# Patient Record
Sex: MALE | Race: WHITE | NOT HISPANIC OR LATINO | Employment: OTHER | RURAL
[De-identification: names, ages, dates, MRNs, and addresses within clinical notes are randomized per-mention and may not be internally consistent; named-entity substitution may affect disease eponyms.]

---

## 2020-08-26 ENCOUNTER — HISTORICAL (OUTPATIENT)
Dept: ADMINISTRATIVE | Facility: HOSPITAL | Age: 70
End: 2020-08-26

## 2020-08-27 LAB — SARS-COV+SARS-COV-2 AG RESP QL IA.RAPID: NEGATIVE

## 2021-06-25 DIAGNOSIS — E29.1 TESTICULAR HYPOFUNCTION: Primary | ICD-10-CM

## 2021-07-07 ENCOUNTER — PROCEDURE VISIT (OUTPATIENT)
Dept: UROLOGY | Facility: CLINIC | Age: 71
End: 2021-07-07
Payer: MEDICARE

## 2021-07-07 VITALS
SYSTOLIC BLOOD PRESSURE: 125 MMHG | HEART RATE: 70 BPM | BODY MASS INDEX: 28.28 KG/M2 | WEIGHT: 202 LBS | DIASTOLIC BLOOD PRESSURE: 82 MMHG | HEIGHT: 71 IN

## 2021-07-07 DIAGNOSIS — E29.1 TESTICULAR HYPOFUNCTION: Primary | ICD-10-CM

## 2021-07-07 DIAGNOSIS — R53.83 FATIGUE, UNSPECIFIED TYPE: ICD-10-CM

## 2021-07-07 PROCEDURE — 11980 IMPLANT HORMONE PELLET(S): CPT | Mod: PBBFAC | Performed by: UROLOGY

## 2021-07-07 PROCEDURE — 11980 PR IMPLANT,HORMONE,SUBCUTANEOUS: ICD-10-PCS | Mod: S$PBB,,, | Performed by: UROLOGY

## 2021-07-07 PROCEDURE — S0189 TESTOSTERONE PELLET 75 MG: HCPCS | Mod: PBBFAC | Performed by: UROLOGY

## 2021-07-07 PROCEDURE — 11980 IMPLANT HORMONE PELLET(S): CPT | Mod: S$PBB,,, | Performed by: UROLOGY

## 2021-07-07 RX ORDER — AA/PROT/LYSINE/METHIO/VIT C/B6 50-12.5 MG
10 TABLET ORAL DAILY
COMMUNITY

## 2021-07-07 RX ORDER — CETIRIZINE HYDROCHLORIDE 10 MG/1
10 TABLET ORAL DAILY
COMMUNITY

## 2021-07-07 RX ORDER — LANSOPRAZOLE 30 MG/1
30 CAPSULE, DELAYED RELEASE ORAL 2 TIMES DAILY
COMMUNITY
Start: 2021-04-22

## 2021-07-07 RX ORDER — HYDROCODONE BITARTRATE AND ACETAMINOPHEN 7.5; 325 MG/1; MG/1
1 TABLET ORAL
COMMUNITY
End: 2023-03-16

## 2021-07-07 RX ORDER — PERPHENAZINE/AMITRIPTYLINE HCL 4 MG-25 MG
TABLET ORAL DAILY
COMMUNITY

## 2021-07-07 RX ORDER — CLONAZEPAM 2 MG/1
1 TABLET ORAL 2 TIMES DAILY
COMMUNITY
Start: 2021-06-08

## 2021-07-07 RX ORDER — TAMSULOSIN HYDROCHLORIDE 0.4 MG/1
CAPSULE ORAL
COMMUNITY
Start: 2021-06-29 | End: 2022-03-11 | Stop reason: SDUPTHER

## 2021-07-07 RX ORDER — TRAMADOL HYDROCHLORIDE 50 MG/1
25 TABLET ORAL NIGHTLY PRN
COMMUNITY
Start: 2021-06-02

## 2021-07-07 RX ORDER — CEPHRADINE 500 MG
5000 CAPSULE ORAL DAILY
COMMUNITY

## 2021-07-07 RX ORDER — HYDROCHLOROTHIAZIDE 12.5 MG/1
12.5 TABLET ORAL DAILY
COMMUNITY
Start: 2021-05-10

## 2021-07-07 RX ORDER — LISINOPRIL 20 MG/1
20 TABLET ORAL 2 TIMES DAILY
COMMUNITY
Start: 2021-05-31

## 2021-07-07 RX ORDER — MULTIVITAMIN
1 TABLET ORAL DAILY
COMMUNITY

## 2021-07-07 RX ORDER — ASPIRIN 81 MG/1
81 TABLET ORAL DAILY
COMMUNITY

## 2021-07-07 RX ORDER — DORZOLAMIDE HYDROCHLORIDE AND TIMOLOL MALEATE 20; 5 MG/ML; MG/ML
SOLUTION/ DROPS OPHTHALMIC
COMMUNITY
Start: 2021-06-28

## 2021-07-07 RX ADMIN — TESTOSTERONE 900 MG: 75 PELLET SUBCUTANEOUS at 05:07

## 2021-07-09 PROBLEM — R53.83 FATIGUE: Status: ACTIVE | Noted: 2021-07-09

## 2021-07-09 PROBLEM — E29.1 TESTICULAR HYPOFUNCTION: Status: ACTIVE | Noted: 2021-07-09

## 2021-11-08 ENCOUNTER — PROCEDURE VISIT (OUTPATIENT)
Dept: UROLOGY | Facility: CLINIC | Age: 71
End: 2021-11-08
Payer: MEDICARE

## 2021-11-08 VITALS
HEIGHT: 71 IN | HEART RATE: 78 BPM | DIASTOLIC BLOOD PRESSURE: 81 MMHG | SYSTOLIC BLOOD PRESSURE: 115 MMHG | BODY MASS INDEX: 27.44 KG/M2 | WEIGHT: 196 LBS

## 2021-11-08 DIAGNOSIS — Z12.5 SCREENING PSA (PROSTATE SPECIFIC ANTIGEN): ICD-10-CM

## 2021-11-08 DIAGNOSIS — E29.1 TESTICULAR HYPOFUNCTION: Primary | ICD-10-CM

## 2021-11-08 DIAGNOSIS — R53.83 OTHER FATIGUE: ICD-10-CM

## 2021-11-08 PROCEDURE — 11980 IMPLANT HORMONE PELLET(S): CPT | Mod: PBBFAC | Performed by: UROLOGY

## 2021-11-08 PROCEDURE — 11980 IMPLANT HORMONE PELLET(S): CPT | Mod: S$PBB,,, | Performed by: UROLOGY

## 2021-11-08 PROCEDURE — S0189 TESTOSTERONE PELLET 75 MG: HCPCS | Mod: PBBFAC | Performed by: UROLOGY

## 2021-11-08 PROCEDURE — 11980 PR IMPLANT,HORMONE,SUBCUTANEOUS: ICD-10-PCS | Mod: S$PBB,,, | Performed by: UROLOGY

## 2021-11-08 RX ORDER — ACETAMINOPHEN 500 MG
5000 TABLET ORAL DAILY
COMMUNITY

## 2021-11-08 RX ORDER — LIDOCAINE HYDROCHLORIDE AND EPINEPHRINE 10; 10 MG/ML; UG/ML
15 INJECTION, SOLUTION INFILTRATION; PERINEURAL
Status: COMPLETED | OUTPATIENT
Start: 2021-11-08 | End: 2021-11-08

## 2021-11-08 RX ADMIN — TESTOSTERONE 900 MG: 75 PELLET SUBCUTANEOUS at 05:11

## 2021-11-08 RX ADMIN — LIDOCAINE HYDROCHLORIDE,EPINEPHRINE BITARTRATE 15 ML: 10; .01 INJECTION, SOLUTION INFILTRATION; PERINEURAL at 05:11

## 2022-01-25 PROCEDURE — 88305 TISSUE EXAM BY PATHOLOGIST: CPT | Mod: 26,,, | Performed by: PATHOLOGY

## 2022-01-25 PROCEDURE — 88305 TISSUE EXAM BY PATHOLOGIST: CPT | Mod: SUR

## 2022-01-25 PROCEDURE — 88305 PATHOLOGY, DERMATOLOGY: ICD-10-PCS | Mod: 26,,, | Performed by: PATHOLOGY

## 2022-01-26 ENCOUNTER — LAB REQUISITION (OUTPATIENT)
Dept: LAB | Facility: HOSPITAL | Age: 72
End: 2022-01-26
Payer: MEDICARE

## 2022-01-26 DIAGNOSIS — D49.2 NEOPLASM OF UNSPECIFIED BEHAVIOR OF BONE, SOFT TISSUE, AND SKIN: ICD-10-CM

## 2022-01-27 LAB
ESTROGEN SERPL-MCNC: NORMAL PG/ML
LAB AP GROSS DESCRIPTION: NORMAL
LAB AP LABORATORY NOTES: NORMAL
LAB AP SPEC A DDX: NORMAL
LAB AP SPEC A MORPHOLOGY: NORMAL
LAB AP SPEC A PROCEDURE: NORMAL
T3RU NFR SERPL: NORMAL %

## 2022-03-09 ENCOUNTER — PROCEDURE VISIT (OUTPATIENT)
Dept: UROLOGY | Facility: CLINIC | Age: 72
End: 2022-03-09
Payer: MEDICARE

## 2022-03-09 VITALS
SYSTOLIC BLOOD PRESSURE: 127 MMHG | WEIGHT: 201 LBS | HEIGHT: 71 IN | DIASTOLIC BLOOD PRESSURE: 89 MMHG | BODY MASS INDEX: 28.14 KG/M2 | HEART RATE: 76 BPM

## 2022-03-09 DIAGNOSIS — E29.1 TESTICULAR HYPOFUNCTION: Primary | ICD-10-CM

## 2022-03-09 DIAGNOSIS — R53.83 FATIGUE, UNSPECIFIED TYPE: ICD-10-CM

## 2022-03-09 DIAGNOSIS — R97.20 ELEVATED PSA: ICD-10-CM

## 2022-03-09 PROCEDURE — 11980 IMPLANT HORMONE PELLET(S): CPT | Mod: S$PBB,,, | Performed by: UROLOGY

## 2022-03-09 PROCEDURE — S0189 TESTOSTERONE PELLET 75 MG: HCPCS | Mod: PBBFAC | Performed by: UROLOGY

## 2022-03-09 PROCEDURE — 11980 PR IMPLANT,HORMONE,SUBCUTANEOUS: ICD-10-PCS | Mod: S$PBB,,, | Performed by: UROLOGY

## 2022-03-09 PROCEDURE — 11980 IMPLANT HORMONE PELLET(S): CPT | Mod: PBBFAC | Performed by: UROLOGY

## 2022-03-09 RX ORDER — KETOCONAZOLE 20 MG/G
CREAM TOPICAL
COMMUNITY
Start: 2022-01-26

## 2022-03-09 RX ORDER — OMEGA-3/DHA/EPA/FISH OIL 35-113.5MG
1 TABLET,CHEWABLE ORAL DAILY
COMMUNITY

## 2022-03-09 RX ORDER — HYDROCORTISONE 25 MG/ML
LOTION TOPICAL
COMMUNITY
Start: 2022-01-25

## 2022-03-09 RX ORDER — LIDOCAINE HYDROCHLORIDE AND EPINEPHRINE 10; 10 MG/ML; UG/ML
30 INJECTION, SOLUTION INFILTRATION; PERINEURAL
Status: COMPLETED | OUTPATIENT
Start: 2022-03-09 | End: 2022-03-09

## 2022-03-09 RX ADMIN — LIDOCAINE HYDROCHLORIDE,EPINEPHRINE BITARTRATE 30 ML: 10; .01 INJECTION, SOLUTION INFILTRATION; PERINEURAL at 04:03

## 2022-03-09 RX ADMIN — TESTOSTERONE 900 MG: 75 PELLET SUBCUTANEOUS at 04:03

## 2022-03-09 NOTE — PROCEDURES
Procedures        Mr. Montez returned today for Testopel insertion for the first time in 4-1/2 months. He thinks he may be trying to go a little too long between his Testopel insertions. The testosterone done at Dr. Bazzi's office last week was still 348 so he is still in range he should be, but he wants to have his insertions a little closer together as patient's fatigue is increased. Also under more stress. Sister  of a Covid problems since we last saw him. Voiding okay. Ready for another Testopel insertion. He had trouble with his hip for a while after the last insertion so we may want to move to the abdominal wall for his Testopel.  [2020]     Mr. Montez is in for Testopel insertion. Lab looks satisfactory. He had Covid on  but had treatment with monoclonal antibody and did very well with the disease. He had fatigue but other than that has done fine. He also has had some increase in bladder outlet obstructive symptoms with weak stream and intermittency. He had his PSA and it is perfectly normal at 2.59. Lab studies look good; he is pleased with the improvement in his lab results. Serum testosterone a couple weeks ago was 538. Ready for next Testopel insertion. He did not like receiving it in the abdominal wall and wants to go back to putting the Testopel in his hips.  [2021]  ---------------------------------------------------------------------------------------------------------------------------------------------------------------------------------------------------  The above notes are from the old electronic system.  Patient here for testoterone pellet insertion.  He did not have any lab data drawn prior to this testosterone pellet insertion as he forgot.  We will skip lab until next time. [,  2021]  ---------------------------------------------------------------------------------------------------------------------------------------------------------------------------------------   Patient has had no new health problems.  He did have his lab data drawn.  PSA is slightly elevated at 4.27  Serum testosterone 587 on March 2, 2022          The patient was placed in the  left lateral jackknife position and the left hip prepared for injection of Testopel in the clinic operating room.  The left sacroiliac joint and the iliac crest were identified. The midline of the imaginary line between these 2 points was identified. A heidi was made 3 cm below this point and a V-shaped marking was made or previous scar was present 2cm inferior to this point. The area was prepped with Betadine and surgical drape applied to the area. This V-shaped area that was outlined was anesthetized with  approximately 25 mL of local anesthetic made by mixing 30 mL of 2% lidocaine and 10 mL of 8.4% sodium bicarbonate solution.  A 8 mm transverse curvilinear incision was made at the apex of the V. A spinal needle was used to anesthetize  along the expected tract of the injected pellets The Testopel stylette with trocar was placed through the stab wound and moved parallel to the skin about 1 cm underneath the skin  in the subcutaneous tissue along the more posterior side of the V. There did seem to be a lot of scar tissue present.  The stylette was removed and 6 pellets of Testopel placed in the trocar. The blunt stylette was then passed down to touch the pellets. This was advanced forward as the trocar was removed leaving the 6 pellets along the posterior side of the V. The procedure was repeated along the more anterior side of the V, again leaving 6 pellets in  for a total of 12. The incision was closed with 3 sutures of 3-0 chromic catgut. Pressure was applied to the wound for 5 minutes. Dressing applied. The patient tolerated the  procedure well and left for a regular exam room in satisfactory condition. [March 9, 2022]

## 2022-03-11 RX ORDER — TAMSULOSIN HYDROCHLORIDE 0.4 MG/1
CAPSULE ORAL
Qty: 90 CAPSULE | Refills: 3 | Status: SHIPPED | OUTPATIENT
Start: 2022-03-11 | End: 2023-03-13

## 2022-03-12 NOTE — PATIENT INSTRUCTIONS
Testosterone pellet insertion as described.  Tolerated well.  Next Testopel insertion on Thursday July 7th at 4:00 p.m.  He will get his lab in [place] again.  Probably needs to get another PSA at that time as it is slightly elevated.

## 2022-07-07 ENCOUNTER — PROCEDURE VISIT (OUTPATIENT)
Dept: UROLOGY | Facility: CLINIC | Age: 72
End: 2022-07-07
Payer: MEDICARE

## 2022-07-07 VITALS
BODY MASS INDEX: 27.44 KG/M2 | HEART RATE: 68 BPM | SYSTOLIC BLOOD PRESSURE: 107 MMHG | HEIGHT: 71 IN | WEIGHT: 196 LBS | DIASTOLIC BLOOD PRESSURE: 76 MMHG

## 2022-07-07 DIAGNOSIS — E29.1 TESTICULAR HYPOFUNCTION: Primary | ICD-10-CM

## 2022-07-07 DIAGNOSIS — R97.20 ELEVATED PSA: ICD-10-CM

## 2022-07-07 DIAGNOSIS — R53.83 FATIGUE, UNSPECIFIED TYPE: ICD-10-CM

## 2022-07-07 PROCEDURE — 11980 PR IMPLANT,HORMONE,SUBCUTANEOUS: ICD-10-PCS | Mod: S$PBB,,, | Performed by: UROLOGY

## 2022-07-07 PROCEDURE — 11980 IMPLANT HORMONE PELLET(S): CPT | Mod: PBBFAC | Performed by: UROLOGY

## 2022-07-07 PROCEDURE — 11980 IMPLANT HORMONE PELLET(S): CPT | Mod: S$PBB,,, | Performed by: UROLOGY

## 2022-07-07 PROCEDURE — S0189 TESTOSTERONE PELLET 75 MG: HCPCS | Mod: PBBFAC | Performed by: UROLOGY

## 2022-07-07 RX ORDER — CHOLECALCIFEROL (VITAMIN D3) 25 MCG
1 TABLET,CHEWABLE ORAL NIGHTLY
COMMUNITY

## 2022-07-07 RX ORDER — SILDENAFIL 100 MG/1
TABLET, FILM COATED ORAL
COMMUNITY
Start: 2022-05-25 | End: 2023-07-19 | Stop reason: SDUPTHER

## 2022-07-07 RX ADMIN — TESTOSTERONE 900 MG: 75 PELLET SUBCUTANEOUS at 07:07

## 2022-07-08 NOTE — PROCEDURES
Procedures        Testopel insertion     The patient was placed in the right  lateral jackknife position and the right hip prepared for injection of Testopel in the clinic operating room.  The right sacroiliac joint and the iliac crest were identified. The midline of the imaginary line between these 2 points was identified. A heidi was made 3 cm below this point and a V-shaped marking was made or previous scar was present 2cm inferior to this point. The area was prepped with Betadine and surgical drape applied to the area. This V-shaped area that was outlined was anesthetized with  approximately 17 mL of local anesthetic made by mixing 15 mL of 2% lidocaine and 5 mL of 8.4% sodium bicarbonate solution.  A 8 mm transverse curvilinear incision was made at the apex of the V. A spinal needle was used to anesthetize  along the expected tract of the injected pellets The Testopel stylette with trocar was placed through the stab wound and moved parallel to the skin about 1 cm underneath the skin  in the subcutaneous tissue along the more posterior side of the V. There did seem to be a lot of scar tissue present.  The stylette was removed and 6 pellets of Testopel placed in the trocar. The blunt stylette was then passed down to touch the pellets. This was advanced forward as the trocar was removed leaving the 6 pellets along the posterior side of the V. The procedure was repeated along the more anterior side of the V, again leaving 6 pellets in  for a total of 12 [900 mg]. The incision was closed with 3 sutures of 3-0 chromic catgut. Pressure was applied to the wound for 5 minutes. Dressing applied. The patient tolerated the procedure well and left for a regular exam room in satisfactory  condition.    ----------------------------------------------------------------------------------------------------------------------------------------------------------------------------------------------------------------------------------------------------------------------------------------------------------------  Mr. Montez  returned today for his testoterone pellet insertion.  He has had a good 4 months.  Laboratory data includes satisfactory testosterone of 379.  Hemoglobin/hematocrit was 12.6 g/45.8%  Basic metabolic profile satisfactory.  BUN elevated at 28 but creatinine normal at 1.2 .  No new health issues.

## 2022-07-08 NOTE — PATIENT INSTRUCTIONS
Testosterone pellet insertion as described.  Tolerated well.  Return for next Testopel insertion on Wednesday November 16th at 3:00 p.m..  No PSA done with current lab.  He will get his lab work prior to next visit with testosterone, H&H, and needs repeat PSA because last PSA was elevated.

## 2022-11-16 ENCOUNTER — PROCEDURE VISIT (OUTPATIENT)
Dept: UROLOGY | Facility: CLINIC | Age: 72
End: 2022-11-16
Payer: MEDICARE

## 2022-11-16 VITALS
HEIGHT: 71 IN | WEIGHT: 200 LBS | DIASTOLIC BLOOD PRESSURE: 60 MMHG | BODY MASS INDEX: 28 KG/M2 | SYSTOLIC BLOOD PRESSURE: 130 MMHG | HEART RATE: 87 BPM

## 2022-11-16 DIAGNOSIS — R53.83 FATIGUE, UNSPECIFIED TYPE: ICD-10-CM

## 2022-11-16 DIAGNOSIS — R97.20 ELEVATED PSA: ICD-10-CM

## 2022-11-16 DIAGNOSIS — E29.1 TESTICULAR HYPOFUNCTION: Primary | ICD-10-CM

## 2022-11-16 PROCEDURE — 11980 IMPLANT HORMONE PELLET(S): CPT | Mod: S$PBB,,, | Performed by: UROLOGY

## 2022-11-16 PROCEDURE — 11980 IMPLANT HORMONE PELLET(S): CPT | Mod: PBBFAC | Performed by: UROLOGY

## 2022-11-16 PROCEDURE — 11980 PR IMPLANT,HORMONE,SUBCUTANEOUS: ICD-10-PCS | Mod: S$PBB,,, | Performed by: UROLOGY

## 2022-11-16 PROCEDURE — S0189 TESTOSTERONE PELLET 75 MG: HCPCS | Mod: PBBFAC | Performed by: UROLOGY

## 2022-11-16 RX ORDER — LIDOCAINE HYDROCHLORIDE AND EPINEPHRINE 10; 10 MG/ML; UG/ML
15 INJECTION, SOLUTION INFILTRATION; PERINEURAL
Status: COMPLETED | OUTPATIENT
Start: 2022-11-16 | End: 2022-11-16

## 2022-11-16 RX ADMIN — TESTOSTERONE 900 MG: 75 PELLET SUBCUTANEOUS at 05:11

## 2022-11-16 RX ADMIN — LIDOCAINE HYDROCHLORIDE,EPINEPHRINE BITARTRATE 15 ML: 10; .01 INJECTION, SOLUTION INFILTRATION; PERINEURAL at 05:11

## 2022-11-17 NOTE — PATIENT INSTRUCTIONS
Testopel insertion as described.  He needs follow-up of the PSA and may need to consider prostate biopsies if still elevated.  Patient will have next testosterone pellet insertion Thursday March 16, 2023 at 3:30 p.m..  He will get his lab done at Dr. Bazzi's office ahead of time.  He needs to get a total testosterone, along with PSA, and H&H.  Follow-up blood pressure 130/60

## 2022-11-17 NOTE — PROCEDURES
Procedures    Patient in for Testopel.  Doing okay clinically.  Also has had elevated PSA on last 2 checks.  The patient had a PSA of 6.38 on November 9.  A total testosterone was not done on the lab he had done that day.  Hemoglobin/hematocrit was 14.2 g/48.8%  Blood pressure has been running low lately and he may need to reduce his blood pressure medication as it sounds like he is possibly having some postural hypotension at times.  Initial blood pressure here today was 93/60 with follow-up 130/60       The patient was placed in the  left lateral jackknife position and the left hip prepared for injection of Testopel in the clinic operating room.  The left sacroiliac joint and the iliac crest were identified. The midline of the imaginary line between these 2 points was identified. A heidi was made 3 cm below this point and a V-shaped marking was made or previous scar was present 2cm inferior to this point. The area was prepped with Betadine and surgical drape applied to the area. This V-shaped area that was outlined was anesthetized with  approximately 15 mL of local anesthetic made by mixing 15 mL of 2% lidocaine and 5 mL of 8.4% sodium bicarbonate solution.  A 8 mm transverse curvilinear incision was made at the apex of the V. A spinal needle was used to anesthetize  along the expected tract of the injected pellets The Testopel stylette with trocar was placed through the stab wound and moved parallel to the skin about 1 cm underneath the skin  in the subcutaneous tissue along the more posterior side of the V. There did seem to be a lot of scar tissue present.  The stylette was removed and 6 pellets of Testopel placed in the trocar. The blunt stylette was then passed down to touch the pellets. This was advanced forward as the trocar was removed leaving the 6 pellets along the posterior side of the V. The procedure was repeated along the more anterior side of the V, again leaving 6 pellets in  for a total of 12. The  incision was closed with 3 sutures of 3-0 chromic catgut. Pressure was applied to the wound for 5 minutes. Dressing applied. The patient tolerated the procedure well and left for a regular exam room in satisfactory condition. [November 16, 2022]

## 2023-03-16 ENCOUNTER — PROCEDURE VISIT (OUTPATIENT)
Dept: UROLOGY | Facility: CLINIC | Age: 73
End: 2023-03-16
Payer: MEDICARE

## 2023-03-16 VITALS
BODY MASS INDEX: 28.56 KG/M2 | WEIGHT: 204 LBS | DIASTOLIC BLOOD PRESSURE: 72 MMHG | HEART RATE: 86 BPM | SYSTOLIC BLOOD PRESSURE: 98 MMHG | HEIGHT: 71 IN

## 2023-03-16 DIAGNOSIS — N32.0 BLADDER NECK OBSTRUCTION: ICD-10-CM

## 2023-03-16 DIAGNOSIS — N23 URETERAL COLIC: ICD-10-CM

## 2023-03-16 DIAGNOSIS — N20.0 NEPHROLITHIASIS: ICD-10-CM

## 2023-03-16 DIAGNOSIS — N13.8 BENIGN PROSTATIC HYPERPLASIA WITH URINARY OBSTRUCTION: ICD-10-CM

## 2023-03-16 DIAGNOSIS — R53.83 FATIGUE, UNSPECIFIED TYPE: ICD-10-CM

## 2023-03-16 DIAGNOSIS — Z87.898 HISTORY OF ELEVATED PSA: ICD-10-CM

## 2023-03-16 DIAGNOSIS — N40.1 BENIGN PROSTATIC HYPERPLASIA WITH URINARY OBSTRUCTION: ICD-10-CM

## 2023-03-16 DIAGNOSIS — E29.1 TESTICULAR HYPOFUNCTION: Primary | ICD-10-CM

## 2023-03-16 PROCEDURE — 11980 PR IMPLANT,HORMONE,SUBCUTANEOUS: ICD-10-PCS | Mod: S$PBB,,, | Performed by: UROLOGY

## 2023-03-16 PROCEDURE — 11980 IMPLANT HORMONE PELLET(S): CPT | Mod: PBBFAC | Performed by: UROLOGY

## 2023-03-16 PROCEDURE — 11980 IMPLANT HORMONE PELLET(S): CPT | Mod: S$PBB,,, | Performed by: UROLOGY

## 2023-03-16 PROCEDURE — S0189 TESTOSTERONE PELLET 75 MG: HCPCS | Mod: PBBFAC | Performed by: UROLOGY

## 2023-03-16 RX ORDER — LIDOCAINE HYDROCHLORIDE AND EPINEPHRINE 10; 10 MG/ML; UG/ML
15 INJECTION, SOLUTION INFILTRATION; PERINEURAL
Status: COMPLETED | OUTPATIENT
Start: 2023-03-16 | End: 2023-03-16

## 2023-03-16 RX ORDER — HYDROMORPHONE HYDROCHLORIDE 2 MG/1
2 TABLET ORAL EVERY 4 HOURS PRN
Qty: 16 TABLET | Refills: 0 | Status: SHIPPED | OUTPATIENT
Start: 2023-03-16

## 2023-03-16 RX ORDER — GUAIFENESIN/PHENYLPROPANOLAMIN
450 EXPECTORANT ORAL DAILY
COMMUNITY

## 2023-03-16 RX ADMIN — TESTOSTERONE 900 MG: 75 PELLET SUBCUTANEOUS at 05:03

## 2023-03-16 RX ADMIN — LIDOCAINE HYDROCHLORIDE,EPINEPHRINE BITARTRATE 15 ML: 10; .01 INJECTION, SOLUTION INFILTRATION; PERINEURAL at 05:03

## 2023-03-17 NOTE — PATIENT INSTRUCTIONS
Testosterone pellet insertion as described.  Reviewed CT scan with patient.  He does have several remaining small stones in his upper tracts.   done.  Patient given prescription for Dilaudid to use when he has additional acute pain.  He already has Phenergan at home.  Return for next Testopel at 1:30 p.m. on Wednesday July 19.

## 2023-03-17 NOTE — PROCEDURES
Procedures    Testopel insertion     The patient was placed in the right  lateral jackknife position and the right hip prepared for injection of Testopel in the clinic operating room.  The right sacroiliac joint and the iliac crest were identified. The midline of the imaginary line between these 2 points was identified. A heidi was made 3 cm below this point and a V-shaped marking was made or previous scar was present 2cm inferior to this point. The area was prepped with Betadine and surgical drape applied to the area. This V-shaped area that was outlined was anesthetized with  approximately 15 mL of local anesthetic made by mixing 15 mL of 2% lidocaine and 5 mL of 8.4% sodium bicarbonate solution.  A 8 mm transverse curvilinear incision was made at the apex of the V. A spinal needle was used to anesthetize  along the expected tract of the injected pellets The Testopel stylette with trocar was placed through the stab wound and moved parallel to the skin about 1 cm underneath the skin  in the subcutaneous tissue along the more posterior side of the V. There did seem to be a lot of scar tissue present.  The stylette was removed and 6 pellets of Testopel placed in the trocar. The blunt stylette was then passed down to touch the pellets. This was advanced forward as the trocar was removed leaving the 6 pellets along the posterior side of the V. The procedure was repeated along the more anterior side of the V, again leaving 6 pellets in  for a total of 12 [900 mg]. The incision was closed with 4 sutures of 3-0 chromic catgut. Pressure was applied to the wound for 5 minutes. Dressing applied. The patient tolerated the procedure well and left for a regular exam room in satisfactory condition.          Mr. Montez  returned today for his testoterone pellet insertion.  He has had a good 4 months in general except he did have a stone that passed in January in another 1 the other day.  He brought a copy of his CT scans on disc  for us to review.  Reviewed with patient and he has several additional stones in his upper tracts.  He is past the 2 stones that had given him trouble.  Laboratory data includes satisfactory testosterone of 453.  Hemoglobin/hematocrit was 13.4 g/46.2%  PSA down slightly to 3.710  No new health issues.

## 2023-07-19 ENCOUNTER — PROCEDURE VISIT (OUTPATIENT)
Dept: UROLOGY | Facility: CLINIC | Age: 73
End: 2023-07-19
Payer: MEDICARE

## 2023-07-19 VITALS
HEART RATE: 78 BPM | HEIGHT: 71 IN | DIASTOLIC BLOOD PRESSURE: 93 MMHG | SYSTOLIC BLOOD PRESSURE: 140 MMHG | BODY MASS INDEX: 28 KG/M2 | WEIGHT: 200 LBS

## 2023-07-19 DIAGNOSIS — N13.8 BENIGN PROSTATIC HYPERPLASIA WITH URINARY OBSTRUCTION: ICD-10-CM

## 2023-07-19 DIAGNOSIS — R97.20 ELEVATED PSA: ICD-10-CM

## 2023-07-19 DIAGNOSIS — N40.1 BENIGN PROSTATIC HYPERPLASIA WITH URINARY OBSTRUCTION: ICD-10-CM

## 2023-07-19 DIAGNOSIS — N52.9 ERECTILE DYSFUNCTION, UNSPECIFIED ERECTILE DYSFUNCTION TYPE: ICD-10-CM

## 2023-07-19 DIAGNOSIS — E29.1 TESTICULAR HYPOFUNCTION: Primary | ICD-10-CM

## 2023-07-19 DIAGNOSIS — N32.0 BLADDER NECK OBSTRUCTION: ICD-10-CM

## 2023-07-19 DIAGNOSIS — R53.83 FATIGUE, UNSPECIFIED TYPE: ICD-10-CM

## 2023-07-19 PROCEDURE — 11980 PR IMPLANT,HORMONE,SUBCUTANEOUS: ICD-10-PCS | Mod: S$PBB,,, | Performed by: UROLOGY

## 2023-07-19 PROCEDURE — S0189 TESTOSTERONE PELLET 75 MG: HCPCS | Mod: PBBFAC | Performed by: UROLOGY

## 2023-07-19 PROCEDURE — 11980 IMPLANT HORMONE PELLET(S): CPT | Mod: PBBFAC | Performed by: UROLOGY

## 2023-07-19 PROCEDURE — 11980 IMPLANT HORMONE PELLET(S): CPT | Mod: S$PBB,,, | Performed by: UROLOGY

## 2023-07-19 RX ORDER — LIDOCAINE HCL/EPINEPHRINE/PF 2%-1:200K
15 VIAL (ML) INJECTION
Status: COMPLETED | OUTPATIENT
Start: 2023-07-19 | End: 2023-07-19

## 2023-07-19 RX ORDER — SILDENAFIL 100 MG/1
100 TABLET, FILM COATED ORAL DAILY PRN
Qty: 30 TABLET | Refills: 11 | Status: SHIPPED | OUTPATIENT
Start: 2023-07-19

## 2023-07-19 RX ORDER — LIDOCAINE HYDROCHLORIDE AND EPINEPHRINE 20; 10 MG/ML; UG/ML
15 INJECTION, SOLUTION INFILTRATION; PERINEURAL
Status: DISCONTINUED | OUTPATIENT
Start: 2023-07-19 | End: 2023-07-19

## 2023-07-19 RX ADMIN — TESTOSTERONE 900 MG: 75 PELLET SUBCUTANEOUS at 03:07

## 2023-07-19 RX ADMIN — Medication 15 ML: at 03:07

## 2023-07-19 NOTE — PROCEDURES
Procedures    Patient doing well without any new problems.  Here for Testopel with last testosterone pellet insertion 4 months ago.  Serum testosterone recently 471. Desires renewal of his sildenafil.     The patient was placed in the  left lateral jackknife position and the left hip prepared for injection of Testopel in the clinic operating room.  The left sacroiliac joint and the iliac crest were identified. The midline of the imaginary line between these 2 points was identified. A heidi was made 3 cm below this point and a V-shaped marking was made or previous scar was present 2cm inferior to this point. The area was prepped with Betadine and surgical drape applied to the area. This V-shaped area that was outlined was anesthetized with  approximately 16 mL of local anesthetic made by mixing 15 mL of 2% lidocaine and 5 mL of 8.4% sodium bicarbonate solution.  A 8 mm transverse curvilinear incision was made at the apex of the V. A spinal needle was used to anesthetize  along the expected tract of the injected pellets The Testopel stylette with trocar was placed through the stab wound and moved parallel to the skin about 1 cm underneath the skin  in the subcutaneous tissue along the more posterior side of the V. There did seem to be a lot of scar tissue present.  The stylette was removed and 6 pellets of Testopel placed in the trocar. The blunt stylette was then passed down to touch the pellets. This was advanced forward as the trocar was removed leaving the 6 pellets along the posterior side of the V. The procedure was repeated along the more anterior side of the V, again leaving 6 pellets in  for a total of 12. The incision was closed with 4 sutures of 3-0 chromic catgut. Pressure was applied to the wound for 5 minutes. Dressing applied. The patient tolerated the procedure well and left for a regular exam room in satisfactory condition. [July 19, 2023

## 2023-07-19 NOTE — PATIENT INSTRUCTIONS
Testosterone pellet insertion as described.  Lab satisfactory.  Sildenafil renewed.  Return for next Testopel on Monday November 27 at 2:30 p.m.  He will get lab studies at Dr. Bazzi's including testosterone, H&H, and will get another PSA for elevation at that time.  Renal function is satisfactory.  Call for problems.

## 2023-11-15 PROCEDURE — 88342 IMHCHEM/IMCYTCHM 1ST ANTB: CPT | Mod: 26,,, | Performed by: PATHOLOGY

## 2023-11-15 PROCEDURE — 88305 TISSUE EXAM BY PATHOLOGIST: CPT | Mod: TC,SUR

## 2023-11-15 PROCEDURE — 88305 PATHOLOGY, DERMATOLOGY: ICD-10-PCS | Mod: 26,,, | Performed by: PATHOLOGY

## 2023-11-15 PROCEDURE — 88342 PATHOLOGY, DERMATOLOGY: ICD-10-PCS | Mod: 26,,, | Performed by: PATHOLOGY

## 2023-11-15 PROCEDURE — 88305 TISSUE EXAM BY PATHOLOGIST: CPT | Mod: 26,,, | Performed by: PATHOLOGY

## 2023-11-16 ENCOUNTER — LAB REQUISITION (OUTPATIENT)
Dept: LAB | Facility: HOSPITAL | Age: 73
End: 2023-11-16
Payer: MEDICARE

## 2023-11-16 DIAGNOSIS — D49.2 NEOPLASM OF UNSPECIFIED BEHAVIOR OF BONE, SOFT TISSUE, AND SKIN: ICD-10-CM

## 2023-11-20 LAB
DHEA SERPL-MCNC: NORMAL
ESTROGEN SERPL-MCNC: NORMAL PG/ML
INSULIN SERPL-ACNC: NORMAL U[IU]/ML
LAB AP GROSS DESCRIPTION: NORMAL
LAB AP LABORATORY NOTES: NORMAL
LAB AP SPEC A DDX: NORMAL
LAB AP SPEC A MORPHOLOGY: NORMAL
LAB AP SPEC A PROCEDURE: NORMAL
LAB AP SPEC B DDX: NORMAL
LAB AP SPEC B MORPHOLOGY: NORMAL
LAB AP SPEC B PROCEDURE: NORMAL
T3RU NFR SERPL: NORMAL %

## 2023-11-27 ENCOUNTER — PROCEDURE VISIT (OUTPATIENT)
Dept: UROLOGY | Facility: CLINIC | Age: 73
End: 2023-11-27
Payer: MEDICARE

## 2023-11-27 VITALS
DIASTOLIC BLOOD PRESSURE: 87 MMHG | WEIGHT: 198 LBS | SYSTOLIC BLOOD PRESSURE: 127 MMHG | BODY MASS INDEX: 27.72 KG/M2 | HEIGHT: 71 IN | HEART RATE: 75 BPM

## 2023-11-27 DIAGNOSIS — E29.1 TESTICULAR HYPOFUNCTION: Primary | ICD-10-CM

## 2023-11-27 DIAGNOSIS — N13.8 BENIGN PROSTATIC HYPERPLASIA WITH URINARY OBSTRUCTION: ICD-10-CM

## 2023-11-27 DIAGNOSIS — N32.0 BLADDER NECK OBSTRUCTION: ICD-10-CM

## 2023-11-27 DIAGNOSIS — R53.83 FATIGUE, UNSPECIFIED TYPE: ICD-10-CM

## 2023-11-27 DIAGNOSIS — R97.20 ELEVATED PSA: ICD-10-CM

## 2023-11-27 DIAGNOSIS — N40.1 BENIGN PROSTATIC HYPERPLASIA WITH URINARY OBSTRUCTION: ICD-10-CM

## 2023-11-27 PROCEDURE — 11980 IMPLANT HORMONE PELLET(S): CPT | Mod: S$PBB,,, | Performed by: UROLOGY

## 2023-11-27 PROCEDURE — 11980 PR IMPLANT,HORMONE,SUBCUTANEOUS: ICD-10-PCS | Mod: S$PBB,,, | Performed by: UROLOGY

## 2023-11-27 PROCEDURE — 99999PBSHW PR PBB SHADOW TECHNICAL ONLY FILED TO HB: ICD-10-PCS | Mod: PBBFAC,,,

## 2023-11-27 PROCEDURE — 11980 IMPLANT HORMONE PELLET(S): CPT | Mod: PBBFAC | Performed by: UROLOGY

## 2023-11-27 PROCEDURE — S0189 TESTOSTERONE PELLET 75 MG: HCPCS | Mod: PBBFAC | Performed by: UROLOGY

## 2023-11-27 PROCEDURE — 99999PBSHW PR PBB SHADOW TECHNICAL ONLY FILED TO HB: Mod: PBBFAC,,,

## 2023-11-27 RX ORDER — LIDOCAINE HYDROCHLORIDE AND EPINEPHRINE 10; 10 MG/ML; UG/ML
15 INJECTION, SOLUTION INFILTRATION; PERINEURAL
Status: COMPLETED | OUTPATIENT
Start: 2023-11-27 | End: 2023-11-27

## 2023-11-27 RX ADMIN — LIDOCAINE HYDROCHLORIDE,EPINEPHRINE BITARTRATE 15 ML: 10; .01 INJECTION, SOLUTION INFILTRATION; PERINEURAL at 04:11

## 2023-11-27 RX ADMIN — TESTOSTERONE 900 MG: 75 PELLET SUBCUTANEOUS at 05:11

## 2023-11-27 NOTE — PROCEDURES
Testopel insertion     The patient was placed in the right  lateral jackknife position and the right hip prepared for injection of Testopel in the clinic operating room.  The right sacroiliac joint and the iliac crest were identified. The midline of the imaginary line between these 2 points was identified. A heidi was made 3 cm below this point and a V-shaped marking was made or previous scar was present 2cm inferior to this point. The area was prepped with Betadine and surgical drape applied to the area. This V-shaped area that was outlined was anesthetized with  approximately 20 mL of local anesthetic made by mixing 15 mL of 2% lidocaine and 5 mL of 8.4% sodium bicarbonate solution.  A 8 mm transverse curvilinear incision was made at the apex of the V. A spinal needle was used to anesthetize  along the expected tract of the injected pellets The Testopel stylette with trocar was placed through the stab wound and moved parallel to the skin about 1 cm underneath the skin  in the subcutaneous tissue along the more posterior side of the V. There did seem to be a lot of scar tissue present.  The stylette was removed and 6 pellets of Testopel placed in the trocar. The blunt stylette was then passed down to touch the pellets. This was advanced forward as the trocar was removed leaving the 6 pellets along the posterior side of the V. The procedure was repeated along the more anterior side of the V, again leaving 6 pellets in  for a total of 12 [900 mg]. The incision was closed with 4 sutures of 3-0 chromic catgut. Pressure was applied to the wound for 5 minutes. Dressing applied. The patient tolerated the procedure well and left for a regular exam room in satisfactory condition.    Serum testosterone was 316 on November 21, 2023    PSA was 4.53 on November 21, 2023    Hemoglobin/hematocrit was 12.4 g/42.4% on November 21, 2023

## 2023-11-28 NOTE — PATIENT INSTRUCTIONS
12 pellets 75 mg testosterone placed as described for a total of 900 mg testosterone.  PSA is elevated but was previously higher than it is now and came back down to normal last year.  Patient tolerated procedure well.  He will return for next Testopel insertion March 27, 2024.  He will get testosterone, H&H, and will get another diagnostic PSA for PSA elevation.  He will get that done at Dr. Bazzi's office.

## 2024-01-08 ENCOUNTER — TELEPHONE (OUTPATIENT)
Dept: UROLOGY | Facility: CLINIC | Age: 74
End: 2024-01-08
Payer: MEDICARE

## 2024-03-09 DIAGNOSIS — R33.9 URINARY RETENTION: ICD-10-CM

## 2024-03-11 RX ORDER — TAMSULOSIN HYDROCHLORIDE 0.4 MG/1
0.4 CAPSULE ORAL NIGHTLY
Qty: 90 CAPSULE | Refills: 3 | Status: SHIPPED | OUTPATIENT
Start: 2024-03-11 | End: 2024-04-17 | Stop reason: SDUPTHER

## 2024-03-11 NOTE — TELEPHONE ENCOUNTER
Patients next  appointment 3/27/24 at 3:15. I spoke with patient and he wants the Tamsulosin 0.4 mg qhs sent back to [facility] in [place], [place]. Patient was in recovery room and Dr Bazzi has just completed his penile implant.

## 2024-04-17 ENCOUNTER — PROCEDURE VISIT (OUTPATIENT)
Dept: UROLOGY | Facility: CLINIC | Age: 74
End: 2024-04-17
Payer: MEDICARE

## 2024-04-17 VITALS
DIASTOLIC BLOOD PRESSURE: 72 MMHG | SYSTOLIC BLOOD PRESSURE: 96 MMHG | HEIGHT: 71 IN | HEART RATE: 98 BPM | WEIGHT: 192 LBS | BODY MASS INDEX: 26.88 KG/M2

## 2024-04-17 DIAGNOSIS — E29.1 TESTICULAR HYPOFUNCTION: Primary | ICD-10-CM

## 2024-04-17 DIAGNOSIS — R53.83 FATIGUE, UNSPECIFIED TYPE: ICD-10-CM

## 2024-04-17 DIAGNOSIS — R97.20 ELEVATED PSA: ICD-10-CM

## 2024-04-17 DIAGNOSIS — N40.1 BENIGN PROSTATIC HYPERPLASIA WITH URINARY OBSTRUCTION: ICD-10-CM

## 2024-04-17 DIAGNOSIS — N13.8 BENIGN PROSTATIC HYPERPLASIA WITH URINARY OBSTRUCTION: ICD-10-CM

## 2024-04-17 DIAGNOSIS — N32.0 BLADDER NECK OBSTRUCTION: ICD-10-CM

## 2024-04-17 DIAGNOSIS — R33.9 URINARY RETENTION: ICD-10-CM

## 2024-04-17 PROCEDURE — 99999PBSHW PR PBB SHADOW TECHNICAL ONLY FILED TO HB: Mod: PBBFAC,,,

## 2024-04-17 PROCEDURE — 11980 IMPLANT HORMONE PELLET(S): CPT | Mod: PBBFAC | Performed by: UROLOGY

## 2024-04-17 PROCEDURE — S0189 TESTOSTERONE PELLET 75 MG: HCPCS | Mod: PBBFAC | Performed by: UROLOGY

## 2024-04-17 PROCEDURE — 11980 IMPLANT HORMONE PELLET(S): CPT | Mod: S$PBB,,, | Performed by: UROLOGY

## 2024-04-17 RX ORDER — LIDOCAINE HYDROCHLORIDE AND EPINEPHRINE 20; 10 MG/ML; UG/ML
15 INJECTION, SOLUTION INFILTRATION; PERINEURAL
Status: COMPLETED | OUTPATIENT
Start: 2024-04-17 | End: 2024-04-17

## 2024-04-17 RX ORDER — TAMSULOSIN HYDROCHLORIDE 0.4 MG/1
0.4 CAPSULE ORAL 2 TIMES DAILY
Qty: 180 CAPSULE | Refills: 3 | Status: SHIPPED | OUTPATIENT
Start: 2024-04-17 | End: 2025-04-12

## 2024-04-17 RX ADMIN — LIDOCAINE HYDROCHLORIDE,EPINEPHRINE BITARTRATE 15 ML: 20; .01 INJECTION, SOLUTION INFILTRATION; PERINEURAL at 03:04

## 2024-04-17 RX ADMIN — TESTOSTERONE 900 MG: 75 PELLET SUBCUTANEOUS at 03:04

## 2024-04-17 NOTE — PROCEDURES
Testosterone pellet insertion    The patient was placed in the  left lateral jackknife position and the left hip prepared for injection of Testopel in the clinic operating room.  The left sacroiliac joint and the iliac crest were identified. The midline of the imaginary line between these 2 points was identified. A heidi was made 3 cm below this point and a V-shaped marking was made or previous scar was present 2cm inferior to this point. The area was prepped with Betadine and surgical drape applied to the area. This V-shaped area that was outlined was anesthetized with  approximately 12 mL of local anesthetic made by mixing 15 mL of 2% lidocaine and 5 mL of 8.4% sodium bicarbonate solution.  A 8 mm transverse curvilinear incision was made at the apex of the V. A spinal needle was used to anesthetize  along the expected tract of the injected pellets The Testopel stylette with trocar was placed through the stab wound and moved parallel to the skin about 1 cm underneath the skin  in the subcutaneous tissue along the more posterior side of the V. There did seem to be a lot of scar tissue present.  The stylette was removed and 6 pellets of Testopel placed in the trocar. The blunt stylette was then passed down to touch the pellets. This was advanced forward as the trocar was removed leaving the 6 pellets along the posterior side of the V. The procedure was repeated along the more anterior side of the V, again leaving 6 pellets in  for a total of 12. The incision was closed with 4 sutures of 3-0 chromic catgut. Pressure was applied to the wound for 5 minutes. Dressing applied. The patient tolerated the procedure well and left for a regular exam room in satisfactory condition. [April 17, 2024]        Other Notes    Recent lab data done by Dr. Fredrick Bazzi brought with patient:    Serum testosterone was 460 on April 11, 2024    PSA was 6.82 on April 11, 2024    Hemoglobin/hematocrit was 13.7 g/47.3% on April 11,  2024  -----------------------------------------------------------------------------------------------------------------------------------------------------------------------------------------------------------

## 2024-04-18 NOTE — PROCEDURES
Patient had his  IPP inserted by Dr. Jensen a few weeks ago.  He is pleased with the results so far.  He said that Dr. Jensen has agreed to continue management of his testicular hypogonadism and will be following him for that also after I retire June 30.  Recommend he get another PSA in about 6 months.  He has had some fluctuation of PSA in the past but may need to have further evaluation of that problem also.

## 2024-04-18 NOTE — PATIENT INSTRUCTIONS
Testosterone pellet insertion as described.  Patient understands his PSA is elevated.  He would like to try increasing tamsulosin to b.i.d. and see if he does any better with voiding with that.  Patient understands I plan to retire this summer.  He plans to continue urological follow-up with Dr. Eris Jensen in [place].

## 2025-02-22 DIAGNOSIS — R33.9 URINARY RETENTION: ICD-10-CM

## 2025-02-25 RX ORDER — TAMSULOSIN HYDROCHLORIDE 0.4 MG/1
CAPSULE ORAL
Qty: 90 CAPSULE | Refills: 0 | OUTPATIENT
Start: 2025-02-25